# Patient Record
Sex: MALE | Race: BLACK OR AFRICAN AMERICAN | NOT HISPANIC OR LATINO | RURAL
[De-identification: names, ages, dates, MRNs, and addresses within clinical notes are randomized per-mention and may not be internally consistent; named-entity substitution may affect disease eponyms.]

---

## 2023-04-04 ENCOUNTER — APPOINTMENT (OUTPATIENT)
Dept: RADIOLOGY | Facility: HOSPITAL | Age: 33
End: 2023-04-04
Attending: INTERNAL MEDICINE

## 2023-04-04 ENCOUNTER — HOSPITAL ENCOUNTER (EMERGENCY)
Facility: HOSPITAL | Age: 33
Discharge: HOME | End: 2023-04-04
Attending: INTERNAL MEDICINE

## 2023-04-04 VITALS
WEIGHT: 131.5 LBS | RESPIRATION RATE: 18 BRPM | OXYGEN SATURATION: 100 % | TEMPERATURE: 99 F | BODY MASS INDEX: 19.93 KG/M2 | DIASTOLIC BLOOD PRESSURE: 86 MMHG | HEIGHT: 68 IN | SYSTOLIC BLOOD PRESSURE: 105 MMHG | HEART RATE: 80 BPM
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
## 2023-04-04 DIAGNOSIS — T14.8XXA GLASS FOREIGN BODY IN SKIN: Primary | ICD-10-CM

## 2023-04-04 DIAGNOSIS — Z18.81 GLASS FOREIGN BODY IN SKIN: Primary | ICD-10-CM

## 2023-04-04 PROCEDURE — 99283 EMERGENCY DEPT VISIT LOW MDM: CPT

## 2023-04-04 PROCEDURE — 99284 PR EMERGENCY DEPT VISIT,LEVEL IV: ICD-10-PCS | Mod: ,,, | Performed by: INTERNAL MEDICINE

## 2023-04-04 PROCEDURE — 73120 X-RAY EXAM OF HAND: CPT | Mod: TC,RT

## 2023-04-04 PROCEDURE — 99284 EMERGENCY DEPT VISIT MOD MDM: CPT | Mod: ,,, | Performed by: INTERNAL MEDICINE

## 2023-04-04 RX ORDER — NAPROXEN 500 MG/1
500 TABLET ORAL 2 TIMES DAILY WITH MEALS
Qty: 14 TABLET | Refills: 0 | Status: SHIPPED | OUTPATIENT
Start: 2023-04-04 | End: 2023-04-11

## 2023-04-05 NOTE — ED TRIAGE NOTES
Pt reports he fell on glass with the palm of the right hand in January of this year.  Pt has not seen a physician for same. There is no broken or open skin in the area.

## 2023-04-05 NOTE — ED PROVIDER NOTES
Encounter Date: 4/4/2023       History     Chief Complaint   Patient presents with    Hand Injury     Glass in palm of right hand approximately 2-3 months ago     Patient comes in complaining of having glass in his right palm of his hand after fall in January.  He paces put alcohol on the better and he has a prolonged.  Not says bothers him more he wants it taken out.  No other evidence of infection drainage discharge or redness.      Review of patient's allergies indicates:  No Known Allergies  History reviewed. No pertinent past medical history.  History reviewed. No pertinent surgical history.  History reviewed. No pertinent family history.  Social History     Tobacco Use    Smoking status: Every Day     Packs/day: 2.00     Types: Cigarettes    Smokeless tobacco: Never   Substance Use Topics    Alcohol use: Yes    Drug use: Yes     Types: Methamphetamines     Review of Systems   Constitutional:  Negative for fever.   HENT:  Negative for sore throat.    Respiratory:  Negative for shortness of breath.    Cardiovascular:  Negative for chest pain.   Gastrointestinal:  Negative for nausea.   Genitourinary:  Negative for dysuria.   Musculoskeletal:  Negative for back pain.   Skin:  Negative for rash.   Neurological:  Negative for weakness.   Hematological:  Does not bruise/bleed easily.     Physical Exam     Initial Vitals [04/04/23 1920]   BP Pulse Resp Temp SpO2   111/78 76 18 98.5 °F (36.9 °C) (!) 94 %      MAP       --         Physical Exam    Nursing note and vitals reviewed.  Constitutional: He appears well-developed.   HENT:   Head: Normocephalic.   Eyes: Pupils are equal, round, and reactive to light.   Neck:   Normal range of motion.  Cardiovascular:  Normal rate.           Pulmonary/Chest: Breath sounds normal.   Abdominal: Abdomen is soft.   Musculoskeletal:         General: Normal range of motion.      Right hand: Tenderness present.      Left hand: Normal.        Hands:       Cervical back: Normal range of  motion.      Comments: Nontender area in the middle of the palm, range of motion flexion extension neurovascular of the extremities and hand normal     Neurological: He is alert. He has normal reflexes.   Skin: Skin is warm.       Medical Screening Exam   See Full Note    ED Course   Procedures  Labs Reviewed - No data to display       Imaging Results              X-Ray Hand 2 View Right (In process)                   X-Rays:   Independently Interpreted Readings:   Other Readings:  No evidence of foreign body on x-ray  Medications - No data to display  Medical Decision Making:   Initial Assessment:   Patient with glass in the right palm for the last 3 months  Differential Diagnosis:   Foreign body [place] in the park  Clinical Tests:   Radiological Study: Reviewed and Ordered  ED Management:  X-ray shows no is a foreign body of glass, a patient referred to surgery for for fluoroscope and removal of the foreign body.                 Clinical Impression:   Final diagnoses:  [T14.8XXA, Z18.81] Glass foreign body in skin (Primary)  [W25.XXXA] Injury from broken glass, initial encounter        ED Disposition Condition    Discharge Stable          ED Prescriptions       Medication Sig Dispense Start Date End Date Auth. Provider    naproxen (NAPROSYN) 500 MG tablet Take 1 tablet (500 mg total) by mouth 2 (two) times daily with meals. for 7 days 14 tablet 4/4/2023 4/11/2023 Sriram Liang MD          Follow-up Information    None          Sriram Liang MD  04/04/23 1946